# Patient Record
Sex: FEMALE | Race: WHITE | URBAN - METROPOLITAN AREA
[De-identification: names, ages, dates, MRNs, and addresses within clinical notes are randomized per-mention and may not be internally consistent; named-entity substitution may affect disease eponyms.]

---

## 2020-02-19 ENCOUNTER — OFFICE VISIT (OUTPATIENT)
Dept: FAMILY MEDICINE CLINIC | Age: 30
End: 2020-02-19

## 2020-02-19 VITALS
SYSTOLIC BLOOD PRESSURE: 116 MMHG | OXYGEN SATURATION: 98 % | DIASTOLIC BLOOD PRESSURE: 78 MMHG | HEART RATE: 105 BPM | HEIGHT: 68 IN | WEIGHT: 184 LBS | RESPIRATION RATE: 18 BRPM | BODY MASS INDEX: 27.89 KG/M2 | TEMPERATURE: 98.2 F

## 2020-02-19 DIAGNOSIS — R68.89 FLU-LIKE SYMPTOMS: ICD-10-CM

## 2020-02-19 DIAGNOSIS — N61.0 MASTITIS, RIGHT, ACUTE: Primary | ICD-10-CM

## 2020-02-19 LAB
FLUAV+FLUBV AG NOSE QL IA.RAPID: NEGATIVE POS/NEG
FLUAV+FLUBV AG NOSE QL IA.RAPID: NEGATIVE POS/NEG
VALID INTERNAL CONTROL?: YES

## 2020-02-19 RX ORDER — CEFTRIAXONE 250 MG/8ML
250 INJECTION, POWDER, FOR SOLUTION INTRAMUSCULAR; INTRAVENOUS ONCE
Qty: 250 MG | Refills: 0
Start: 2020-02-19 | End: 2020-02-19

## 2020-02-19 NOTE — PROGRESS NOTES
Chief Complaint   Patient presents with    Generalized Body Aches     Pt state she has bodyaches.  Fever     Pt having fever and chills.  Breast pain     Pt having R breast pain     1. Have you been to the ER, urgent care clinic since your last visit? Hospitalized since your last visit? No    2. Have you seen or consulted any other health care providers outside of the [address] [place] [address] since your last visit? Include any pap smears or colon screening.  No

## 2020-02-19 NOTE — PROGRESS NOTES
HISTORY OF PRESENT ILLNESS  Royal Easley is a 26 y.o. female. 1 day hx of rt breast redness and pain,myalgias and malaise,flulike sx. Prescrbed cephalosporin from Ob,greg today was advised to see us to r/o flu,sepsis  Generalized Body Aches   The history is provided by the patient. This is a new problem. The current episode started 12 to 24 hours ago. The problem occurs hourly. The problem has not changed since onset. Pertinent negatives include no chest pain and no abdominal pain. Fever    The history is provided by the patient. This is a new problem. The current episode started 12 to 24 hours ago. The problem occurs hourly. The problem has not changed since onset. The maximum temperature noted was 99 - 99.9 F. Associated symptoms include rash. Pertinent negatives include no chest pain, no congestion and no cough. Breast pain   The history is provided by the patient. This is a new problem. The current episode started 12 to 24 hours ago. The problem occurs hourly. The problem has not changed since onset. Pertinent negatives include no chest pain and no abdominal pain. Review of Systems   Constitutional: Positive for fever. HENT: Negative for congestion. Respiratory: Negative for cough. Cardiovascular: Negative for chest pain. Gastrointestinal: Negative for abdominal pain. Genitourinary: Negative for dysuria. Skin: Positive for rash. Physical Exam  Constitutional:       Appearance: Normal appearance. HENT:      Head: Normocephalic and atraumatic. Right Ear: Tympanic membrane normal.      Left Ear: Tympanic membrane normal.      Nose: Nose normal.      Mouth/Throat:      Mouth: Mucous membranes are moist.      Pharynx: No posterior oropharyngeal erythema. Neck:      Musculoskeletal: Normal range of motion and neck supple. Cardiovascular:      Rate and Rhythm: Normal rate and regular rhythm. Heart sounds: Normal heart sounds. No murmur.    Pulmonary:      Effort: Pulmonary effort is normal.      Breath sounds: Normal breath sounds. Lymphadenopathy:      Cervical: No cervical adenopathy. Skin:     Comments: Subareolar erythema and tenderneaa,Rt Breast   Neurological:      Mental Status: She is alert. ASSESSMENT and PLAN  Diagnoses and all orders for this visit:    1. Mastitis, right, acute  -     cefTRIAXone (ROCEPHIN) 250 mg injection; 250 mg by IntraMUSCular route once for 1 dose. -     CEFTRIAXONE SODIUM INJECTION  MG  -     MN THER/PROPH/DIAG INJECTION, SUBCUT/IM    2. Flu-like symptoms  -     AMB POC JUSTINE INFLUENZA A/B TEST  -     AMB POC COMPLETE CBC, AUTOMATED    3.  Patient is a currently breast-feeding mother

## 2020-04-08 DIAGNOSIS — J32.0 MAXILLARY SINUSITIS, UNSPECIFIED CHRONICITY: Primary | ICD-10-CM

## 2020-04-08 NOTE — TELEPHONE ENCOUNTER
Charisma Worley  Female, 26 y.o., 1990  Weight:   184 lb (83.5 kg)  MRN:   [id number]  Phone:   [phone] [facility])  PCP:   Mansi Blum MD  Primary Cvg:   [facility]  Last Appt With Me  None  Next Appt With Me  None  Next Appt  None  Non-Urgent Medical Question     From  Charisma Worley To  [facility] Nurse Pool Sent  4/8/2020  4:09 PM   Zuri Mustafa,     It seems as though I have caught what Franchesca Santoyo had last week and I have been dealing with an extremely sore throat for over 5 days now and congestion. I was wondering if I could have a z-pack sent in to my pharmacy as I have been using Mucinex and Tylenol and not getting much relief. Thanks,   Charisma Worley   [phone]       Non-Urgent Medical Question     Homero Taylor MD 13 minutes ago (4:09 PM)         Zuri Mustafa,     It seems as though I have caught what Franchesca Santoyo had last week and I have been dealing with an extremely sore throat for over 5 days now and congestion.  I was wondering if I could have a z-pack sent in to my pharmacy as I have been using Mucinex and Tylenol and not getting much relief.      Thanks,  Charisma Worley   [phone]            Encounter Messages     Read Composed From To  Subject   Y 4/8/2020  4:09 PM Erwin Kumar MD  Non-Urgent Medical Question

## 2020-04-09 RX ORDER — AZITHROMYCIN 250 MG/1
TABLET, FILM COATED ORAL
Qty: 6 TAB | Refills: 0 | Status: SHIPPED | OUTPATIENT
Start: 2020-04-09 | End: 2020-04-13

## 2020-08-19 DIAGNOSIS — J32.0 MAXILLARY SINUSITIS, UNSPECIFIED CHRONICITY: Primary | ICD-10-CM

## 2020-08-19 RX ORDER — AZITHROMYCIN 250 MG/1
TABLET, FILM COATED ORAL
Qty: 6 TAB | Refills: 0 | Status: SHIPPED | OUTPATIENT
Start: 2020-08-19 | End: 2020-08-24

## 2020-08-19 NOTE — TELEPHONE ENCOUNTER
[address]  Female, 31 y.o., 1990  Weight:   184 lb (83.5 kg)  MRN:   [id number]  Phone:   [phone] [facility])  PCP:   Raul Wu MD  Primary Cvg:   [facility]  Last Appt With Me  None  Next Appt With Me  None  Next Appt  None  Non-Urgent Medical Question     Coni Peres MD 2 minutes ago (7:35 AM)          I am suffering from a sinus infection. I have been taking Sudafed. I was wondering if you could send in a z-pack?             Encounter Messages     Read  Composed  From  To   Subject    Y  8/19/2020  7:35 AM  Jorden Sims MD   Non-Urgent Medical Question

## 2020-08-27 ENCOUNTER — TELEPHONE (OUTPATIENT)
Dept: FAMILY MEDICINE CLINIC | Age: 30
End: 2020-08-27

## 2020-08-27 DIAGNOSIS — J32.0 MAXILLARY SINUSITIS, UNSPECIFIED CHRONICITY: Primary | ICD-10-CM

## 2020-08-27 RX ORDER — AMOXICILLIN AND CLAVULANATE POTASSIUM 875; 125 MG/1; MG/1
1 TABLET, FILM COATED ORAL 2 TIMES DAILY WITH MEALS
Qty: 14 TAB | Refills: 0 | Status: SHIPPED | OUTPATIENT
Start: 2020-08-27 | End: 2020-09-03

## 2020-08-27 NOTE — TELEPHONE ENCOUNTER
Sent via my chart - finished Santo Thomason , but still has sx.  She wants to know if she needs another antibiotic. telephone number [phone]

## 2020-08-27 NOTE — TELEPHONE ENCOUNTER
[address]  Female, 31 y.o., 1990  Weight:   184 lb (83.5 kg)  MRN:   [id number]  Phone:   [phone] Kianna Bailey  PCP:   Carol Cutler MD  Primary Cvg:   [facility]  Last Appt With Me  None  Next Appt With Me  None  Next Appt  None  Non-Urgent Medical Question     George Ochoa MD 17 hours ago (4:06 PM)          I finished my zpack 3 days ago. I started to feel better but am still having severe sinus pressure. I actually got covid tested through my work, I was negative. But I was wondering if its ever advised to take another dose of antibiotics since the first course didnt knock it out. You  Jennifer Camel 8 days ago          We will [facility] to [facility] once approved. George Ochoa MD 8 days ago          I am suffering from a sinus infection. I have been taking Sudafed. I was wondering if you could send in a z-pack?             Encounter Messages     Read  Composed  From  To   Subject    Y  8/26/2020  4:06 PM  Melany Lewis MD   RE: Non-Urgent Medical Question    Y  8/19/2020  7:40 AM  MARGARITO Barahona   RE: Non-Urgent Medical Question    Y  8/19/2020  7:35 AM  Melany Lewis MD   Non-Urgent Medical Question

## 2020-09-09 ENCOUNTER — TELEPHONE (OUTPATIENT)
Dept: FAMILY MEDICINE CLINIC | Age: 30
End: 2020-09-09

## 2020-09-09 DIAGNOSIS — L25.5 RHUS DERMATITIS: Primary | ICD-10-CM

## 2020-09-09 RX ORDER — TRIAMCINOLONE ACETONIDE 1 MG/G
CREAM TOPICAL
Qty: 85 G | Refills: 1 | Status: SHIPPED | OUTPATIENT
Start: 2020-09-09 | End: 2022-02-09

## 2020-09-09 RX ORDER — PREDNISONE 10 MG/1
10 TABLET ORAL 2 TIMES DAILY
Qty: 10 TAB | Refills: 0 | Status: SHIPPED | OUTPATIENT
Start: 2020-09-09

## 2020-09-09 NOTE — TELEPHONE ENCOUNTER
[address]  Female, 31 y.o., 1990  Weight:   184 lb (83.5 kg)  MRN:   [id number]  Phone:   [phone] Mariella Cabello)  PCP:   Mindi Islas MD  Primary Cvg:   [facility]  Last Appt With Me  None  Next Appt With Me  None  Next Appt  None  Non-Urgent Medical Question     Gus Weiss MD 11 hours ago (8:42 PM)          Photo of rash on neck as of tonight. Gus Weiss MD 14 hours ago (5:34 PM)          Never received phone call from Dr Victoria Villasenor. Rash continues to itch and be inflamed on neck. Claude Saldana 21 hours ago (11:04 AM)          Message sent to Dr. Victoria Villasenor he will be calling you. Gus Weiss MD 2 days ago          I developed this rash on my neck last Wednesday. ..thought nothing of it and started using hydrocortisone cream. It sort of helped for a while but then it stopped helping so I tried Benadryl cream which I thought was helping as well but it wasnt. I havent gotten into poison ivy or poison oak. No idea what it could be but it itches so bad!! It also appears that I have a little patch on the inside crease of my elbow now as well. Any ideas as to what this is and why nothing is helping the itch?             Encounter Messages     Read  Composed  From  To   Subject    N  9/8/2020  8:42 PM  Ford Lester MD    RE: Non-Urgent Medical Question    Y  9/8/2020  5:34 PM  Ford Lester MD   RE: Non-Urgent Medical Question    Y  9/8/2020 11:04 AM  MARGARITO Clarke   RE: Non-Urgent Medical Question    Y  9/7/2020 11:38 AM  Ford Lester MD    Non-Urgent Medical Question

## 2020-10-26 ENCOUNTER — TELEPHONE (OUTPATIENT)
Dept: FAMILY MEDICINE CLINIC | Age: 30
End: 2020-10-26

## 2020-10-26 DIAGNOSIS — J32.0 MAXILLARY SINUSITIS, UNSPECIFIED CHRONICITY: Primary | ICD-10-CM

## 2020-10-26 RX ORDER — AZITHROMYCIN 250 MG/1
TABLET, FILM COATED ORAL
Qty: 6 TAB | Refills: 0 | Status: SHIPPED | OUTPATIENT
Start: 2020-10-26

## 2021-04-30 RX ORDER — AZITHROMYCIN 250 MG/1
TABLET, FILM COATED ORAL
Qty: 6 TAB | Refills: 0 | Status: SHIPPED | OUTPATIENT
Start: 2021-04-30 | End: 2021-05-05

## 2021-04-30 NOTE — TELEPHONE ENCOUNTER
[address]  Female, 31 y.o., 1990  Weight:   184 lb (83.5 kg)  MRN:   [id number]  Phone:   [phone] Rod[facility] Armstrong)  PCP:   Tamie Mirza MD  Primary Cvg:   [facility]  Last Appt With Me  None  Next Appt With Me  None  Next Appt  None  Non-Urgent Medical Question    Nasra Shelton MD 39 minutes ago (9:07 AM)        Peña Rachellekelsy Mcclendon been dealing with sinus problems now for about a week and was hoping to get a zpack since Sudafed isnt clearing it up and my head is killing me.             Encounter Messages    Read Composed From To  Subject   Y 4/30/2021  9:07 AM Julia Garza MD  Non-Urgent Medical Question

## 2021-12-16 ENCOUNTER — TELEPHONE (OUTPATIENT)
Dept: FAMILY MEDICINE CLINIC | Age: 31
End: 2021-12-16

## 2021-12-16 DIAGNOSIS — F41.1 GENERALIZED ANXIETY DISORDER: ICD-10-CM

## 2021-12-16 DIAGNOSIS — F41.1 ANXIETY STATE: ICD-10-CM

## 2021-12-16 DIAGNOSIS — F41.9 ANXIETY DISORDER, UNSPECIFIED TYPE: Primary | ICD-10-CM

## 2021-12-16 RX ORDER — PROPRANOLOL HYDROCHLORIDE 10 MG/1
10 TABLET ORAL 2 TIMES DAILY
Qty: 60 TABLET | Refills: 2 | Status: SHIPPED | OUTPATIENT
Start: 2021-12-16 | End: 2022-03-12

## 2021-12-16 RX ORDER — ALPRAZOLAM 0.25 MG/1
0.25 TABLET ORAL
Qty: 30 TABLET | Refills: 2 | Status: SHIPPED | OUTPATIENT
Start: 2021-12-16

## 2021-12-16 NOTE — TELEPHONE ENCOUNTER
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 The whole process has been overwhelming and has been causing me stress and anxiety, however its what I want to do with my careerits just the unknown that is giving me so much anxiety. 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 You had prescribed me propranolol 10mg and Xanax 0.25mg, both of which were a life saver in times of severe anxiety.   [phone]

## 2022-02-08 DIAGNOSIS — L25.5 RHUS DERMATITIS: ICD-10-CM

## 2022-02-09 RX ORDER — TRIAMCINOLONE ACETONIDE 1 MG/G
CREAM TOPICAL
Qty: 90 G | Refills: 1 | Status: SHIPPED | OUTPATIENT
Start: 2022-02-09

## 2022-03-12 DIAGNOSIS — F41.1 ANXIETY STATE: ICD-10-CM

## 2022-03-12 RX ORDER — PROPRANOLOL HYDROCHLORIDE 10 MG/1
TABLET ORAL
Qty: 180 TABLET | Refills: 0 | Status: SHIPPED | OUTPATIENT
Start: 2022-03-12

## 2022-04-05 ENCOUNTER — TELEPHONE (OUTPATIENT)
Dept: FAMILY MEDICINE CLINIC | Age: 32
End: 2022-04-05

## 2022-04-05 NOTE — TELEPHONE ENCOUNTER
Subject of call discussed in detail,recommendations  Recommend bid omeprazole plus otc antacids,to call prn

## 2022-04-05 NOTE — TELEPHONE ENCOUNTER
EC                    Sultana Morales  Female, 27 y.o., 1990  Weight:   184 lb (83.5 kg)    MRN:   [id number]  Phone:   [phone] Maria Howell              PCP:   Tisha Silva MD  Primary Cvg:   [facility]    Last Appt With Me  None    Next Appt With Me  None    Next Appt  None                    Please send to Dr Manas Castillo, Seth Cunningham, Essie Jimenes MD 9 hours ago (10:33 PM)     [facility] there,     This message is intended to be sent to Dr Reshma Weiss. This morning at around 5am I woke up with intense pain in my chest that felt almost like heart burn or bad indigestion, it was so bad I was unable to fall back asleep. As the day progressed the pain continued intermittently with the intensity increasing. I was in such discomfort [facility]. They admitted me for chest pain and did a work up which involved 2 EKGs, lab work, and X-ray. The provider said everything looked ok however he was concerned it may be my gallbladder and said we could do an ultrasound to confirm however I decided not to do that. I was written for a prescription for Prilosec and advised to follow up with you. Obviously I am concerned because nothing is seeming to help the symptoms I am experiencing.                 Encounter Messages    Read Composed From To  Subject   Y 4/4/2022 10:33 PM Greg Cardenas MD  Please send to Dr Shane Melo

## 2022-04-06 ENCOUNTER — TELEPHONE (OUTPATIENT)
Dept: FAMILY MEDICINE CLINIC | Age: 32
End: 2022-04-06

## 2022-04-06 NOTE — TELEPHONE ENCOUNTER
EC                      Kika Rosas  Female, 27 y.o., 1990  Weight:   184 lb (83.5 kg)    MRN:   [id number]  Phone:   [phone] Jaspreet Mendez              PCP:   Nell Crowder MD  Primary Cvg:   [facility]    Last Appt With Me  None    Next Appt With Me  None    Next Appt  None                    Please send to Dr Rashard Xavier, Clarisa Wang, Joseluis Reardon MD 9 minutes ago (12:06 PM)     EC      After speaking to you I started having severe abdominal pain and decided to go back to the ER to have them check my gallbladder. They did ultrasound of gallbladder, pancreas and liver and all looked good. They advised me that Im probably dealing with gastritis and referred me to see Luis Eduardo Sauer at [facility]. I called and left a message trying to make an appointment with him. What do you think? Keep on track with the PPI, and antacid and see if I can get in to see the GI specialist? Do you have a referral for someone else or do you think its ok to move forward with him? You  Kamlesh, [address] [place] [address] (7:51 AM)     LB      This message was sent to Dr. Kasi Chicas he will contact you. Luz Shen MD 2 days ago     [facility] there,     This message is intended to be sent to Dr Kasi Chicas. This morning at around 5am I woke up with intense pain in my chest that felt almost like heart burn or bad indigestion, it was so bad I was unable to fall back asleep. As the day progressed the pain continued intermittently with the intensity increasing. I was in such discomfort I went to [facility] ER located on [facility]. They admitted me for chest pain and did a work up which involved 2 EKGs, lab work, and X-ray. The provider said everything looked ok however he was concerned it may be my gallbladder and said we could do an ultrasound to confirm however I decided not to do that.  I was written for a prescription for Prilosec and advised to follow up with you. Obviously I am concerned because nothing is seeming to help the symptoms I am experiencing.                 Encounter Messages    Read Composed From To  Subject   Y 4/6/2022 12:06 PM Tyra Rivers MD  Please send to Dr Serjio Peter 4/5/2022  7:51 AM MARGARITO Carney  Please send to Dr Serjio Peter 4/4/2022 10:33 PM Tyra Rivers MD  Please send to Dr Kelley Humphrey

## 2022-07-28 NOTE — TELEPHONE ENCOUNTER
[address]  Female, 31 y.o., 1990  Weight:   184 lb (83.5 kg)  MRN:   [id number]  Phone:   [phone] Augie Rivera  PCP:   Cheri Munoz MD  Primary Cvg:   [facility]  Last Appt With Me  None  Next Appt With Me  None  Next Appt  None  Non-Urgent Medical Question     Don Moore MD 4 days ago          Hi! I was recently covid tested at work  and was negative however I still have congestion and cough (which I attribute to allergies/sinus with the weather) so they are referring me to my PCP and not allowing me to return to work until Dr Amy Lujan provides written documentation. Can you please have Dr Amy Lujan call me so I can return to work tomorrow!  Thank you [phone]            Encounter Messages     Read  Composed  From  To   Subject    Y  10/22/2020 11:22 AM  Dion Merrill MD   Non-Urgent Medical Question [facility])

## 2022-11-04 ENCOUNTER — TELEPHONE (OUTPATIENT)
Dept: FAMILY MEDICINE CLINIC | Age: 32
End: 2022-11-04

## 2022-11-04 DIAGNOSIS — J01.00 SUBACUTE MAXILLARY SINUSITIS: Primary | ICD-10-CM

## 2022-11-04 RX ORDER — AZITHROMYCIN 250 MG/1
TABLET, FILM COATED ORAL
Qty: 6 TABLET | Refills: 0 | Status: SHIPPED | OUTPATIENT
Start: 2022-11-04

## 2022-11-04 RX ORDER — AZITHROMYCIN 250 MG/1
TABLET, FILM COATED ORAL
Qty: 6 TABLET | Refills: 0 | Status: CANCELLED | OUTPATIENT
Start: 2022-11-04 | End: 2022-11-09

## 2022-11-04 NOTE — TELEPHONE ENCOUNTER
----- Message from Leeann Baker sent at 11/4/2022  8:13 AM EDT -----  Regarding: Please send to Dr Binta Duran been dealing with sinus pressure and taking Sudafed and Mucinex without relief,  is it possible to get a zpack script sent to [facility] at [address] [facility]?

## 2023-07-17 ENCOUNTER — TELEPHONE (OUTPATIENT)
Age: 33
End: 2023-07-17

## 2023-07-17 NOTE — TELEPHONE ENCOUNTER
Pt has not been seen in over 3 years and was offered an appointment for Friday and refused the appointment. Pt was advised to go to  [facility], that way Dr. Guero Christian could see her chart and she could follow up with Dr. Guero Christian and she stated no that's ok. Pt stated she wanted to come and just sit and wait until he finished and she didn't care how long it took. I explained to the pt that we don't have walk-ins.

## 2023-07-17 NOTE — TELEPHONE ENCOUNTER
Patient states that she need to be seen soon for cough and congestion did not see anything sooner for patient I offered her 7/21 @ 12:20 she says that was to far out for her please give her a call with something sooner she can be reached @ [phone]

## 2023-09-13 ENCOUNTER — TELEPHONE (OUTPATIENT)
Age: 33
End: 2023-09-13

## 2023-09-20 ENCOUNTER — OFFICE VISIT (OUTPATIENT)
Age: 33
End: 2023-09-20
Payer: COMMERCIAL

## 2023-09-20 VITALS
RESPIRATION RATE: 18 BRPM | TEMPERATURE: 97.6 F | HEART RATE: 84 BPM | DIASTOLIC BLOOD PRESSURE: 82 MMHG | BODY MASS INDEX: 24.01 KG/M2 | OXYGEN SATURATION: 100 % | HEIGHT: 68 IN | WEIGHT: 158.4 LBS | SYSTOLIC BLOOD PRESSURE: 121 MMHG

## 2023-09-20 DIAGNOSIS — Z00.00 ANNUAL PHYSICAL EXAM: Primary | ICD-10-CM

## 2023-09-20 LAB
BILIRUBIN, URINE, POC: NEGATIVE
BLOOD URINE, POC: NEGATIVE
GLUCOSE URINE, POC: NEGATIVE
KETONES, URINE, POC: NEGATIVE
LEUKOCYTE ESTERASE, URINE, POC: NEGATIVE
NITRITE, URINE, POC: NEGATIVE
PH, URINE, POC: 5.5 (ref 4.6–8)
PROTEIN,URINE, POC: NEGATIVE
SPECIFIC GRAVITY, URINE, POC: 1.01 (ref 1–1.03)
URINALYSIS CLARITY, POC: CLEAR
URINALYSIS COLOR, POC: NORMAL
UROBILINOGEN, POC: NORMAL

## 2023-09-20 PROCEDURE — 90471 IMMUNIZATION ADMIN: CPT | Performed by: FAMILY MEDICINE

## 2023-09-20 PROCEDURE — 90674 CCIIV4 VAC NO PRSV 0.5 ML IM: CPT | Performed by: FAMILY MEDICINE

## 2023-09-20 PROCEDURE — 99395 PREV VISIT EST AGE 18-39: CPT | Performed by: FAMILY MEDICINE

## 2023-09-20 PROCEDURE — 81001 URINALYSIS AUTO W/SCOPE: CPT | Performed by: FAMILY MEDICINE

## 2023-09-20 RX ORDER — TERBINAFINE HYDROCHLORIDE 250 MG/1
250 TABLET ORAL DAILY
COMMUNITY
Start: 2015-09-23 | End: 2023-09-20 | Stop reason: CLARIF

## 2023-09-20 RX ORDER — ALPRAZOLAM 0.25 MG/1
0.25 TABLET ORAL DAILY PRN
COMMUNITY
Start: 2021-12-16 | End: 2023-09-20 | Stop reason: CLARIF

## 2023-09-20 RX ORDER — NORETHINDRONE ACETATE AND ETHINYL ESTRADIOL 5-7-9-7
1 KIT ORAL DAILY
COMMUNITY
Start: 2023-09-13

## 2023-09-20 RX ORDER — TRIAMCINOLONE ACETONIDE 1 MG/G
CREAM TOPICAL 3 TIMES DAILY PRN
COMMUNITY
Start: 2022-02-09 | End: 2023-09-20 | Stop reason: CLARIF

## 2023-09-20 RX ORDER — PROPRANOLOL HYDROCHLORIDE 10 MG/1
1 TABLET ORAL 2 TIMES DAILY
COMMUNITY
Start: 2022-03-12 | End: 2023-09-20 | Stop reason: CLARIF

## 2023-09-20 SDOH — ECONOMIC STABILITY: FOOD INSECURITY: WITHIN THE PAST 12 MONTHS, YOU WORRIED THAT YOUR FOOD WOULD RUN OUT BEFORE YOU GOT MONEY TO BUY MORE.: NEVER TRUE

## 2023-09-20 SDOH — ECONOMIC STABILITY: FOOD INSECURITY: WITHIN THE PAST 12 MONTHS, THE FOOD YOU BOUGHT JUST DIDN'T LAST AND YOU DIDN'T HAVE MONEY TO GET MORE.: NEVER TRUE

## 2023-09-20 SDOH — ECONOMIC STABILITY: INCOME INSECURITY: HOW HARD IS IT FOR YOU TO PAY FOR THE VERY BASICS LIKE FOOD, HOUSING, MEDICAL CARE, AND HEATING?: NOT HARD AT ALL

## 2023-09-20 SDOH — ECONOMIC STABILITY: HOUSING INSECURITY
IN THE LAST 12 MONTHS, WAS THERE A TIME WHEN YOU DID NOT HAVE A STEADY PLACE TO SLEEP OR SLEPT IN A SHELTER (INCLUDING NOW)?: NO

## 2023-09-20 ASSESSMENT — PATIENT HEALTH QUESTIONNAIRE - PHQ9
SUM OF ALL RESPONSES TO PHQ QUESTIONS 1-9: 0
SUM OF ALL RESPONSES TO PHQ QUESTIONS 1-9: 0
1. LITTLE INTEREST OR PLEASURE IN DOING THINGS: 0
SUM OF ALL RESPONSES TO PHQ QUESTIONS 1-9: 0
2. FEELING DOWN, DEPRESSED OR HOPELESS: 0
SUM OF ALL RESPONSES TO PHQ9 QUESTIONS 1 & 2: 0
SUM OF ALL RESPONSES TO PHQ QUESTIONS 1-9: 0

## 2023-09-20 NOTE — PROGRESS NOTES
Chief Complaint   Patient presents with    Annual Exam     Patient is here today for her annual exam.      1. Have you been to the ER, urgent care clinic since your last visit? Hospitalized since your last visit? No    2. Have you seen or consulted any other health care providers outside of the [address] [place] [address] since your last visit? Include any pap smears or colon screening.   No
Patient was given her flu vaccine in her left deltoid.
Soft, non-tender. Bowel sounds and aorta are normal. She exhibits no organomegaly, mass or bruit. Musculoskeletal: Normal range of motion, no synovitis. She exhibits no edema. Neurological: She is alert and oriented to person, place, and time. She has normal reflexes. No cranial nerve deficit. Coordination normal.   Skin: Skin is warm and dry. There is no rash or erythema. No suspicious lesions noted. Psychiatric: She has a normal mood and affect.  Her speech is normal and behavior is normal. Judgment, cognition and memory are normal.     Assessment/Plan:    Lebron Rodriguez was seen today for annual exam.    Diagnoses and all orders for this visit:    Annual physical exam  -     AMB POC URINALYSIS DIP STICK AUTO W/ MICRO    Other orders  -     Influenza, FLUCELVAX, (age 10 mo+), IM, PF, 0.5 mL

## 2023-09-21 LAB
ALBUMIN SERPL-MCNC: 4.3 G/DL (ref 3.5–5)
ALBUMIN/GLOB SERPL: 1.4 (ref 1.1–2.2)
ALP SERPL-CCNC: 52 U/L (ref 45–117)
ALT SERPL-CCNC: 16 U/L (ref 12–78)
ANION GAP SERPL CALC-SCNC: 5 MMOL/L (ref 5–15)
AST SERPL-CCNC: 9 U/L (ref 15–37)
BASOPHILS # BLD: 0.1 K/UL (ref 0–0.1)
BASOPHILS NFR BLD: 1 % (ref 0–1)
BILIRUB SERPL-MCNC: 0.3 MG/DL (ref 0.2–1)
BUN SERPL-MCNC: 14 MG/DL (ref 6–20)
BUN/CREAT SERPL: 19 (ref 12–20)
CALCIUM SERPL-MCNC: 9.6 MG/DL (ref 8.5–10.1)
CHLORIDE SERPL-SCNC: 105 MMOL/L (ref 97–108)
CHOLEST SERPL-MCNC: 192 MG/DL
CO2 SERPL-SCNC: 27 MMOL/L (ref 21–32)
CREAT SERPL-MCNC: 0.73 MG/DL (ref 0.55–1.02)
DIFFERENTIAL METHOD BLD: NORMAL
EOSINOPHIL # BLD: 0.2 K/UL (ref 0–0.4)
EOSINOPHIL NFR BLD: 2 % (ref 0–7)
ERYTHROCYTE [DISTWIDTH] IN BLOOD BY AUTOMATED COUNT: 12.8 % (ref 11.5–14.5)
GLOBULIN SER CALC-MCNC: 3.1 G/DL (ref 2–4)
GLUCOSE SERPL-MCNC: 82 MG/DL (ref 65–100)
HCT VFR BLD AUTO: 41.1 % (ref 35–47)
HDLC SERPL-MCNC: 82 MG/DL
HDLC SERPL: 2.3 (ref 0–5)
HGB BLD-MCNC: 13.3 G/DL (ref 11.5–16)
IMM GRANULOCYTES # BLD AUTO: 0 K/UL (ref 0–0.04)
IMM GRANULOCYTES NFR BLD AUTO: 0 % (ref 0–0.5)
LDLC SERPL CALC-MCNC: 95.2 MG/DL (ref 0–100)
LYMPHOCYTES # BLD: 3.1 K/UL (ref 0.8–3.5)
LYMPHOCYTES NFR BLD: 34 % (ref 12–49)
MCH RBC QN AUTO: 27.9 PG (ref 26–34)
MCHC RBC AUTO-ENTMCNC: 32.4 G/DL (ref 30–36.5)
MCV RBC AUTO: 86.2 FL (ref 80–99)
MONOCYTES # BLD: 0.5 K/UL (ref 0–1)
MONOCYTES NFR BLD: 6 % (ref 5–13)
NEUTS SEG # BLD: 5.3 K/UL (ref 1.8–8)
NEUTS SEG NFR BLD: 57 % (ref 32–75)
NRBC # BLD: 0 K/UL (ref 0–0.01)
NRBC BLD-RTO: 0 PER 100 WBC
PLATELET # BLD AUTO: 265 K/UL (ref 150–400)
PMV BLD AUTO: 11.8 FL (ref 8.9–12.9)
POTASSIUM SERPL-SCNC: 4.1 MMOL/L (ref 3.5–5.1)
PROT SERPL-MCNC: 7.4 G/DL (ref 6.4–8.2)
RBC # BLD AUTO: 4.77 M/UL (ref 3.8–5.2)
SODIUM SERPL-SCNC: 137 MMOL/L (ref 136–145)
TRIGL SERPL-MCNC: 74 MG/DL
VLDLC SERPL CALC-MCNC: 14.8 MG/DL
WBC # BLD AUTO: 9.1 K/UL (ref 3.6–11)

## 2024-07-11 ENCOUNTER — TELEPHONE (OUTPATIENT)
Age: 34
End: 2024-07-11

## 2024-07-11 DIAGNOSIS — J01.00 SUBACUTE MAXILLARY SINUSITIS: Primary | ICD-10-CM

## 2024-07-11 RX ORDER — AZITHROMYCIN 250 MG/1
TABLET, FILM COATED ORAL
Qty: 6 TABLET | Refills: 0 | Status: SHIPPED | OUTPATIENT
Start: 2024-07-11 | End: 2024-07-21

## 2024-07-11 RX ORDER — GUAIFENESIN AND DEXTROMETHORPHAN HYDROBROMIDE 600; 30 MG/1; MG/1
1 TABLET, EXTENDED RELEASE ORAL 2 TIMES DAILY PRN
Qty: 20 TABLET | Refills: 2 | Status: SHIPPED | OUTPATIENT
Start: 2024-07-11

## 2024-07-11 NOTE — TELEPHONE ENCOUNTER
July 11, 2024  Sarah Beth Hamlin  to [facility] Clinical Staff (supporting Joseph Jeff MD)         7/11/24 10:15 AM  Hey,     I’ve been fighting a sinus infection for several days now and was wondering if you could send something in to the [facility]?   -------------------------------------------------------------------------    Pt stated her sx are head pressure, congestion and pain behind the eyes and she has not taken a COVID test.  She can be reached at [phone].

## 2024-07-11 NOTE — TELEPHONE ENCOUNTER
----- Message from Sarah Beth Hamlin sent at 7/11/2024 10:15 AM EDT -----  Regarding: Please send to dr samson  Contact: [phone]  Hey,    I’ve been fighting a sinus infection for several days now and was wondering if [facility]?

## 2024-11-04 ENCOUNTER — TELEPHONE (OUTPATIENT)
Age: 34
End: 2024-11-04

## 2024-11-04 NOTE — TELEPHONE ENCOUNTER
Sarah Beth QURESHI [facility] Clinical Staff (supporting Joseph Jeff MD)2 days ago       Hello,     Is it possible to try zepbound for weight loss? I lost a significant amount of weight with diet and exercise about 2 years ago but have gained it all back and am struggling to lose it again. I was hoping I could try zepbound for weight loss.  [phone]

## 2024-11-29 ENCOUNTER — OFFICE VISIT (OUTPATIENT)
Age: 34
End: 2024-11-29
Payer: COMMERCIAL

## 2024-11-29 VITALS
HEIGHT: 68 IN | BODY MASS INDEX: 30.19 KG/M2 | RESPIRATION RATE: 16 BRPM | TEMPERATURE: 96 F | DIASTOLIC BLOOD PRESSURE: 80 MMHG | HEART RATE: 83 BPM | OXYGEN SATURATION: 98 % | WEIGHT: 199.2 LBS | SYSTOLIC BLOOD PRESSURE: 121 MMHG

## 2024-11-29 DIAGNOSIS — Z71.3 WEIGHT LOSS COUNSELING, ENCOUNTER FOR: ICD-10-CM

## 2024-11-29 DIAGNOSIS — E66.811 CLASS 1 OBESITY WITHOUT SERIOUS COMORBIDITY WITH BODY MASS INDEX (BMI) OF 30.0 TO 30.9 IN ADULT, UNSPECIFIED OBESITY TYPE: Primary | ICD-10-CM

## 2024-11-29 PROCEDURE — G8484 FLU IMMUNIZE NO ADMIN: HCPCS | Performed by: FAMILY MEDICINE

## 2024-11-29 PROCEDURE — G8417 CALC BMI ABV UP PARAM F/U: HCPCS | Performed by: FAMILY MEDICINE

## 2024-11-29 PROCEDURE — G8427 DOCREV CUR MEDS BY ELIG CLIN: HCPCS | Performed by: FAMILY MEDICINE

## 2024-11-29 PROCEDURE — 1036F TOBACCO NON-USER: CPT | Performed by: FAMILY MEDICINE

## 2024-11-29 PROCEDURE — 99213 OFFICE O/P EST LOW 20 MIN: CPT | Performed by: FAMILY MEDICINE

## 2024-11-29 RX ORDER — PHENTERMINE HYDROCHLORIDE 37.5 MG/1
37.5 TABLET ORAL
Qty: 30 TABLET | Refills: 0 | Status: SHIPPED | OUTPATIENT
Start: 2024-11-29 | End: 2024-12-29

## 2024-11-29 SDOH — ECONOMIC STABILITY: FOOD INSECURITY: WITHIN THE PAST 12 MONTHS, THE FOOD YOU BOUGHT JUST DIDN'T LAST AND YOU DIDN'T HAVE MONEY TO GET MORE.: NEVER TRUE

## 2024-11-29 SDOH — ECONOMIC STABILITY: INCOME INSECURITY: HOW HARD IS IT FOR YOU TO PAY FOR THE VERY BASICS LIKE FOOD, HOUSING, MEDICAL CARE, AND HEATING?: NOT HARD AT ALL

## 2024-11-29 SDOH — ECONOMIC STABILITY: FOOD INSECURITY: WITHIN THE PAST 12 MONTHS, YOU WORRIED THAT YOUR FOOD WOULD RUN OUT BEFORE YOU GOT MONEY TO BUY MORE.: NEVER TRUE

## 2024-11-29 ASSESSMENT — PATIENT HEALTH QUESTIONNAIRE - PHQ9
SUM OF ALL RESPONSES TO PHQ QUESTIONS 1-9: 0
1. LITTLE INTEREST OR PLEASURE IN DOING THINGS: NOT AT ALL
SUM OF ALL RESPONSES TO PHQ QUESTIONS 1-9: 0
2. FEELING DOWN, DEPRESSED OR HOPELESS: NOT AT ALL
SUM OF ALL RESPONSES TO PHQ9 QUESTIONS 1 & 2: 0
SUM OF ALL RESPONSES TO PHQ QUESTIONS 1-9: 0
SUM OF ALL RESPONSES TO PHQ QUESTIONS 1-9: 0

## 2024-11-29 ASSESSMENT — ENCOUNTER SYMPTOMS
ABDOMINAL PAIN: 0
ABDOMINAL DISTENTION: 0
WHEEZING: 0
SHORTNESS OF BREATH: 0
STRIDOR: 0

## 2024-11-29 NOTE — PROGRESS NOTES
Chief Complaint   Patient presents with    Weight Management       \"Have you been to the ER, urgent care clinic since your last visit?  Hospitalized since your last visit?\"    NO    “Have you seen or consulted any other health care providers outside of [facility] since your last visit?”    NO     “Have you had a pap smear?”    NO    No cervical cancer screening on file             Click Here for Release of Records Request         Health Maintenance Due   Topic Date Due    Varicella vaccine (1 of 2 - 13+ 2-dose series) Never done    Hepatitis C screen  Never done    Cervical cancer screen  Never done    Flu vaccine (1) 2024    COVID-19 Vaccine ( season) 2024    Depression Screen  2024        The patient, Sarah Beth Hamlin, identity was verified by name and .

## 2024-11-29 NOTE — PROGRESS NOTES
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 The current episode started more than 1 year ago. The problem occurs daily. The problem has been unchanged. Pertinent negatives include no abdominal pain, arthralgias, chest pain, congestion, fatigue, headaches or myalgias.      Review of Systems   Constitutional:  Negative for appetite change and fatigue.   HENT:  Negative for congestion.    Respiratory:  Negative for shortness of breath, wheezing and stridor.    Cardiovascular:  Negative for chest pain and palpitations.   Gastrointestinal:  Negative for abdominal distention and abdominal pain.   Genitourinary:  Negative for pelvic pain.   Musculoskeletal:  Negative for arthralgias and myalgias.   Neurological:  Negative for light-headedness and headaches.   Psychiatric/Behavioral:  Negative for agitation and dysphoric mood. The patient is not nervous/anxious.              Medications reviewed:  Current Outpatient Medications   Medication Sig    phentermine (ADIPEX-P) 37.5 MG tablet Take 1 tablet by mouth every morning (before breakfast) for 30 days. Max Daily Amount: 37.5 mg    TILIA FE 1-20/1-30/1-35 MG-MCG TABS Take 1 tablet by mouth daily    Dextromethorphan-guaiFENesin (MUCINEX DM)  MG TB12 Take 1 tablet by mouth 2 times daily as needed (congestion) (Patient not taking: Reported on 2024)     No current facility-administered medications for this visit.        Objective:   Vitals:  /80   Pulse 83   Temp (!) 96 °F (35.6 °C) (Oral)   Resp 16   Ht 1.727 m (5' 8\")   Wt 90.4 kg (199 lb 3.2 oz)   LMP 2024   SpO2 98%   BMI 30.29 kg/m²        PHYSICAL EXAM:  General:

## 2024-12-02 LAB
ALBUMIN SERPL-MCNC: 4.4 G/DL (ref 3.5–5)
ALBUMIN/GLOB SERPL: 1.3 (ref 1.1–2.2)
ALP SERPL-CCNC: 81 U/L (ref 45–117)
ALT SERPL-CCNC: 32 U/L (ref 12–78)
ANION GAP SERPL CALC-SCNC: 7 MMOL/L (ref 2–12)
AST SERPL-CCNC: 22 U/L (ref 15–37)
BASOPHILS # BLD: 0.1 K/UL (ref 0–0.1)
BASOPHILS NFR BLD: 1 % (ref 0–1)
BILIRUB SERPL-MCNC: 0.3 MG/DL (ref 0.2–1)
BUN SERPL-MCNC: 15 MG/DL (ref 6–20)
BUN/CREAT SERPL: 20 (ref 12–20)
CALCIUM SERPL-MCNC: 10.3 MG/DL (ref 8.5–10.1)
CHLORIDE SERPL-SCNC: 106 MMOL/L (ref 97–108)
CO2 SERPL-SCNC: 28 MMOL/L (ref 21–32)
CREAT SERPL-MCNC: 0.75 MG/DL (ref 0.55–1.02)
DIFFERENTIAL METHOD BLD: NORMAL
EOSINOPHIL # BLD: 0.1 K/UL (ref 0–0.4)
EOSINOPHIL NFR BLD: 1 % (ref 0–7)
ERYTHROCYTE [DISTWIDTH] IN BLOOD BY AUTOMATED COUNT: 12.6 % (ref 11.5–14.5)
GLOBULIN SER CALC-MCNC: 3.4 G/DL (ref 2–4)
GLUCOSE SERPL-MCNC: 96 MG/DL (ref 65–100)
HCT VFR BLD AUTO: 42.3 % (ref 35–47)
HGB BLD-MCNC: 13.8 G/DL (ref 11.5–16)
IMM GRANULOCYTES # BLD AUTO: 0 K/UL (ref 0–0.04)
IMM GRANULOCYTES NFR BLD AUTO: 0 % (ref 0–0.5)
LYMPHOCYTES # BLD: 1.7 K/UL (ref 0.8–3.5)
LYMPHOCYTES NFR BLD: 19 % (ref 12–49)
MCH RBC QN AUTO: 28 PG (ref 26–34)
MCHC RBC AUTO-ENTMCNC: 32.6 G/DL (ref 30–36.5)
MCV RBC AUTO: 86 FL (ref 80–99)
MONOCYTES # BLD: 0.6 K/UL (ref 0–1)
MONOCYTES NFR BLD: 6 % (ref 5–13)
NEUTS SEG # BLD: 6.8 K/UL (ref 1.8–8)
NEUTS SEG NFR BLD: 73 % (ref 32–75)
NRBC # BLD: 0 K/UL (ref 0–0.01)
NRBC BLD-RTO: 0 PER 100 WBC
PLATELET # BLD AUTO: 309 K/UL (ref 150–400)
PMV BLD AUTO: 11.2 FL (ref 8.9–12.9)
POTASSIUM SERPL-SCNC: 4.4 MMOL/L (ref 3.5–5.1)
PROT SERPL-MCNC: 7.8 G/DL (ref 6.4–8.2)
RBC # BLD AUTO: 4.92 M/UL (ref 3.8–5.2)
SODIUM SERPL-SCNC: 141 MMOL/L (ref 136–145)
T4 SERPL-MCNC: 10.9 UG/DL (ref 4.8–13.9)
TSH SERPL DL<=0.05 MIU/L-ACNC: 0.56 UIU/ML (ref 0.36–3.74)
WBC # BLD AUTO: 9.3 K/UL (ref 3.6–11)